# Patient Record
Sex: MALE | HISPANIC OR LATINO | URBAN - METROPOLITAN AREA
[De-identification: names, ages, dates, MRNs, and addresses within clinical notes are randomized per-mention and may not be internally consistent; named-entity substitution may affect disease eponyms.]

---

## 2021-07-28 ENCOUNTER — OFFICE VISIT (OUTPATIENT)
Dept: URBAN - METROPOLITAN AREA CLINIC 23 | Facility: CLINIC | Age: 41
End: 2021-07-28

## 2021-07-28 DIAGNOSIS — T15.11XA FOREIGN BODY IN CONJUNCTIVAL SAC OF RIGHT EYE, INITIAL ENCOUNTER: Primary | ICD-10-CM

## 2021-07-28 PROCEDURE — 65222 REMOVE FOREIGN BODY FROM EYE: CPT | Performed by: OPTOMETRIST

## 2021-07-28 RX ORDER — TOBRAMYCIN AND DEXAMETHASONE 3; 1 MG/ML; MG/ML
SUSPENSION/ DROPS OPHTHALMIC
Qty: 1 | Refills: 0 | Status: ACTIVE
Start: 2021-07-28

## 2021-07-28 ASSESSMENT — INTRAOCULAR PRESSURE
OS: 16
OD: 14

## 2021-07-28 NOTE — IMPRESSION/PLAN
Impression: Foreign body in conjunctival sac of right eye, initial encounter: T15.11xA. Plan: Foreign body was removed in office, due to inflammation will prescribe Tobradex TID for a week.

## 2024-10-30 ENCOUNTER — OFFICE VISIT (OUTPATIENT)
Dept: URBAN - METROPOLITAN AREA CLINIC 23 | Facility: CLINIC | Age: 44
End: 2024-10-30

## 2024-10-30 DIAGNOSIS — S05.02XA INJURY OF CONJUNCTIVA W/O FB OF LEFT EYE, INITIAL ENCOUNTER: Primary | ICD-10-CM

## 2024-10-30 PROCEDURE — 99203 OFFICE O/P NEW LOW 30 MIN: CPT | Performed by: OPTOMETRIST

## 2024-10-30 RX ORDER — PREDNISOLONE ACETATE 10 MG/ML
1 % SUSPENSION/ DROPS OPHTHALMIC
Qty: 5 | Refills: 1 | Status: INACTIVE
Start: 2024-10-30 | End: 2024-10-30

## 2024-10-30 RX ORDER — PREDNISOLONE ACETATE 10 MG/ML
1 % SUSPENSION/ DROPS OPHTHALMIC
Qty: 5 | Refills: 1 | Status: ACTIVE
Start: 2024-10-30

## 2024-10-30 ASSESSMENT — INTRAOCULAR PRESSURE
OS: 17
OD: 15